# Patient Record
Sex: FEMALE | Race: WHITE | NOT HISPANIC OR LATINO | URBAN - METROPOLITAN AREA
[De-identification: names, ages, dates, MRNs, and addresses within clinical notes are randomized per-mention and may not be internally consistent; named-entity substitution may affect disease eponyms.]

---

## 2017-11-02 ENCOUNTER — IMPORTED ENCOUNTER (OUTPATIENT)
Dept: URBAN - METROPOLITAN AREA CLINIC 50 | Facility: CLINIC | Age: 72
End: 2017-11-02

## 2017-11-08 ENCOUNTER — IMPORTED ENCOUNTER (OUTPATIENT)
Dept: URBAN - METROPOLITAN AREA CLINIC 50 | Facility: CLINIC | Age: 72
End: 2017-11-08

## 2018-03-27 ENCOUNTER — IMPORTED ENCOUNTER (OUTPATIENT)
Dept: URBAN - METROPOLITAN AREA CLINIC 50 | Facility: CLINIC | Age: 73
End: 2018-03-27

## 2018-11-12 ENCOUNTER — IMPORTED ENCOUNTER (OUTPATIENT)
Dept: URBAN - METROPOLITAN AREA CLINIC 50 | Facility: CLINIC | Age: 73
End: 2018-11-12

## 2019-12-02 ENCOUNTER — IMPORTED ENCOUNTER (OUTPATIENT)
Dept: URBAN - METROPOLITAN AREA CLINIC 50 | Facility: CLINIC | Age: 74
End: 2019-12-02

## 2020-12-03 ENCOUNTER — IMPORTED ENCOUNTER (OUTPATIENT)
Dept: URBAN - METROPOLITAN AREA CLINIC 50 | Facility: CLINIC | Age: 75
End: 2020-12-03

## 2021-04-18 ASSESSMENT — VISUAL ACUITY
OD_CC: J1+/-
OD_BAT: 20/20
OS_BAT: 20/25
OS_CC: 20/25
OS_BAT: 20/20
OS_OTHER: 20/20. 20/20.
OS_CC: J1+
OD_CC: 20/20
OD_CC: 20/20
OD_OTHER: 20/25.
OS_CC: 20/20-
OD_CC: 20/25
OS_CC: 20/20
OD_OTHER: 20/20. 20/25.
OD_OTHER: 20/20. 20/30.
OD_CC: J1+
OS_OTHER: 20/25. 20/30.
OD_CC: J1+
OS_CC: 20/20-1
OS_CC: J1+
OS_CC: J1+/-
OD_CC: 20/20
OD_BAT: 20/20

## 2021-04-18 ASSESSMENT — TONOMETRY
OS_IOP_MMHG: 13
OD_IOP_MMHG: 14
OD_IOP_MMHG: 14
OS_IOP_MMHG: 13
OS_IOP_MMHG: 14
OS_IOP_MMHG: 14
OD_IOP_MMHG: 14
OD_IOP_MMHG: 13

## 2021-12-01 ENCOUNTER — PREPPED CHART (OUTPATIENT)
Dept: URBAN - METROPOLITAN AREA CLINIC 53 | Facility: CLINIC | Age: 76
End: 2021-12-01

## 2021-12-09 ENCOUNTER — COMPREHENSIVE EXAM (OUTPATIENT)
Dept: URBAN - METROPOLITAN AREA CLINIC 53 | Facility: CLINIC | Age: 76
End: 2021-12-09

## 2021-12-09 DIAGNOSIS — H04.123: ICD-10-CM

## 2021-12-09 DIAGNOSIS — H52.4: ICD-10-CM

## 2021-12-09 DIAGNOSIS — H35.372: ICD-10-CM

## 2021-12-09 DIAGNOSIS — H25.13: ICD-10-CM

## 2021-12-09 PROCEDURE — 92014 COMPRE OPH EXAM EST PT 1/>: CPT

## 2021-12-09 PROCEDURE — 92015 DETERMINE REFRACTIVE STATE: CPT

## 2021-12-09 PROCEDURE — 92134 CPTRZ OPH DX IMG PST SGM RTA: CPT

## 2021-12-09 ASSESSMENT — VISUAL ACUITY
OS_CC: J1+
OD_CC: 20/20
OD_GLARE: 20/30
OU_CC: J1+
OS_CC: 20/20
OS_GLARE: 20/30
OD_CC: J1+
OU_CC: 20/20

## 2021-12-09 ASSESSMENT — TONOMETRY
OD_IOP_MMHG: 15
OS_IOP_MMHG: 16

## 2022-12-21 ENCOUNTER — ESTABLISHED PATIENT (OUTPATIENT)
Dept: URBAN - METROPOLITAN AREA CLINIC 49 | Facility: CLINIC | Age: 77
End: 2022-12-21

## 2022-12-21 PROCEDURE — 92012 INTRM OPH EXAM EST PATIENT: CPT

## 2022-12-21 ASSESSMENT — TONOMETRY
OS_IOP_MMHG: 17
OD_IOP_MMHG: 17

## 2022-12-21 ASSESSMENT — VISUAL ACUITY
OD_CC: 20/20-2
OS_CC: 20/25-2

## 2022-12-21 NOTE — PATIENT DISCUSSION
[facility], [place], Emergency Department  [address] [place] [id number]  Phone:  [phone]                                    Niyah Freeman   MRN: [id number]    Department:  [facility], Emergency Department   Date of Visit:  12/29/2018           After Visit Summary Signature Page    I have received my discharge instructions, and my questions have been answered. I have discussed any challenges I see with this plan with the nurse or doctor.    ..........................................................................................................................................  Patient/Patient Representative Signature      ..........................................................................................................................................  Patient Representative Print Name and Relationship to Patient    ..................................................               ................................................  Date                                   Time    ..........................................................................................................................................  Reviewed by Signature/Title    ...................................................              ..............................................  Date                                               Time          22EPIC Rev 08/18        Patient given Rx for glasses.

## 2022-12-21 NOTE — PATIENT DISCUSSION
Explained that the allergies will not go away. Explained it is a soft tissue, should not be bothersome, normal side effect of allergies. Explained that if eyes become bloodshot as mentioned by patient, could be dry eyes advised to use AT's.

## 2023-01-05 ENCOUNTER — COMPREHENSIVE EXAM (OUTPATIENT)
Dept: URBAN - METROPOLITAN AREA CLINIC 53 | Facility: CLINIC | Age: 78
End: 2023-01-05

## 2023-01-05 DIAGNOSIS — H52.4: ICD-10-CM

## 2023-01-05 DIAGNOSIS — H35.372: ICD-10-CM

## 2023-01-05 DIAGNOSIS — H25.13: ICD-10-CM

## 2023-01-05 PROCEDURE — 92015 DETERMINE REFRACTIVE STATE: CPT

## 2023-01-05 PROCEDURE — 92014 COMPRE OPH EXAM EST PT 1/>: CPT

## 2023-01-05 PROCEDURE — 92134 CPTRZ OPH DX IMG PST SGM RTA: CPT

## 2023-01-05 ASSESSMENT — VISUAL ACUITY
OD_GLARE: 20/25
OU_CC: 20/20
OU_CC: J1 @ 18 IN
OS_GLARE: 20/25
OD_GLARE: 20/20
OS_CC: 20/25
OD_CC: 20/20
OS_GLARE: 20/25

## 2023-01-05 ASSESSMENT — TONOMETRY
OD_IOP_MMHG: 12
OS_IOP_MMHG: 12

## 2024-01-18 ENCOUNTER — COMPREHENSIVE EXAM (OUTPATIENT)
Dept: URBAN - METROPOLITAN AREA CLINIC 53 | Facility: CLINIC | Age: 79
End: 2024-01-18

## 2024-01-18 DIAGNOSIS — H04.123: ICD-10-CM

## 2024-01-18 DIAGNOSIS — H35.372: ICD-10-CM

## 2024-01-18 DIAGNOSIS — H25.13: ICD-10-CM

## 2024-01-18 DIAGNOSIS — H43.392: ICD-10-CM

## 2024-01-18 DIAGNOSIS — H52.4: ICD-10-CM

## 2024-01-18 PROCEDURE — 92015 DETERMINE REFRACTIVE STATE: CPT

## 2024-01-18 PROCEDURE — 99214 OFFICE O/P EST MOD 30 MIN: CPT

## 2024-01-18 PROCEDURE — 92134 CPTRZ OPH DX IMG PST SGM RTA: CPT

## 2024-01-18 ASSESSMENT — VISUAL ACUITY
OS_CC: 20/20-1
OU_CC: 20/20-1
OD_CC: 20/20-2
OU_CC: J1 @ 15IN
OS_GLARE: 20/25
OD_GLARE: 20/25
OD_GLARE: 20/20
OS_GLARE: 20/20

## 2024-01-18 ASSESSMENT — TONOMETRY
OD_IOP_MMHG: 17
OS_IOP_MMHG: 17

## 2025-02-10 ENCOUNTER — COMPREHENSIVE EXAM (OUTPATIENT)
Age: 80
End: 2025-02-10

## 2025-02-10 DIAGNOSIS — H25.13: ICD-10-CM

## 2025-02-10 DIAGNOSIS — H04.123: ICD-10-CM

## 2025-02-10 DIAGNOSIS — H52.203: ICD-10-CM

## 2025-02-10 PROCEDURE — 92014 COMPRE OPH EXAM EST PT 1/>: CPT

## 2025-02-10 PROCEDURE — 92015 DETERMINE REFRACTIVE STATE: CPT

## 2025-03-17 ENCOUNTER — CONTACT LENSES/GLASSES VISIT (OUTPATIENT)
Age: 80
End: 2025-03-17

## 2025-03-17 DIAGNOSIS — H52.4: ICD-10-CM

## 2025-03-17 PROCEDURE — 92015GRNC REFRACTION GLASSES RECHECK NO CHARGE: Mod: NC

## 2025-05-12 ENCOUNTER — CONTACT LENSES/GLASSES VISIT (OUTPATIENT)
Age: 80
End: 2025-05-12

## 2025-05-12 DIAGNOSIS — H53.2: ICD-10-CM

## 2025-05-12 DIAGNOSIS — H52.4: ICD-10-CM

## 2025-05-12 PROCEDURE — 92060 SENSORIMOTOR EXAMINATION: CPT

## 2025-05-12 PROCEDURE — 92015GRNC REFRACTION GLASSES RECHECK NO CHARGE

## 2025-06-02 ENCOUNTER — CONTACT LENSES/GLASSES VISIT (OUTPATIENT)
Age: 80
End: 2025-06-02

## 2025-06-02 DIAGNOSIS — H53.2: ICD-10-CM

## 2025-06-02 PROCEDURE — 92015GRNC REFRACTION GLASSES RECHECK NO CHARGE
